# Patient Record
Sex: FEMALE | Race: WHITE | NOT HISPANIC OR LATINO | Employment: FULL TIME | URBAN - NONMETROPOLITAN AREA
[De-identification: names, ages, dates, MRNs, and addresses within clinical notes are randomized per-mention and may not be internally consistent; named-entity substitution may affect disease eponyms.]

---

## 2023-07-14 DIAGNOSIS — F32.A DEPRESSION, UNSPECIFIED DEPRESSION TYPE: ICD-10-CM

## 2023-07-14 RX ORDER — DESVENLAFAXINE SUCCINATE 50 MG/1
50 TABLET, EXTENDED RELEASE ORAL DAILY
COMMUNITY
End: 2023-07-14 | Stop reason: SDUPTHER

## 2023-07-14 RX ORDER — DESVENLAFAXINE 50 MG/1
50 TABLET, EXTENDED RELEASE ORAL NIGHTLY
Qty: 30 TABLET | Refills: 0 | Status: SHIPPED | OUTPATIENT
Start: 2023-07-14 | End: 2023-08-09 | Stop reason: SDUPTHER

## 2023-07-14 RX ORDER — BUSPIRONE HYDROCHLORIDE 10 MG/1
TABLET ORAL
Qty: 75 TABLET | Refills: 0 | Status: SHIPPED | OUTPATIENT
Start: 2023-07-14 | End: 2023-08-09 | Stop reason: SDUPTHER

## 2023-08-01 ENCOUNTER — APPOINTMENT (OUTPATIENT)
Dept: PRIMARY CARE | Facility: CLINIC | Age: 48
End: 2023-08-01
Payer: COMMERCIAL

## 2023-08-09 ENCOUNTER — OFFICE VISIT (OUTPATIENT)
Dept: PRIMARY CARE | Facility: CLINIC | Age: 48
End: 2023-08-09
Payer: COMMERCIAL

## 2023-08-09 VITALS
DIASTOLIC BLOOD PRESSURE: 74 MMHG | OXYGEN SATURATION: 96 % | BODY MASS INDEX: 23.64 KG/M2 | SYSTOLIC BLOOD PRESSURE: 115 MMHG | RESPIRATION RATE: 18 BRPM | WEIGHT: 156 LBS | HEIGHT: 68 IN | HEART RATE: 76 BPM

## 2023-08-09 DIAGNOSIS — F41.9 ANXIETY: ICD-10-CM

## 2023-08-09 DIAGNOSIS — F32.A DEPRESSION, UNSPECIFIED DEPRESSION TYPE: ICD-10-CM

## 2023-08-09 DIAGNOSIS — J44.9 CHRONIC OBSTRUCTIVE PULMONARY DISEASE, UNSPECIFIED COPD TYPE (MULTI): ICD-10-CM

## 2023-08-09 DIAGNOSIS — Z12.31 ENCOUNTER FOR SCREENING MAMMOGRAM FOR MALIGNANT NEOPLASM OF BREAST: ICD-10-CM

## 2023-08-09 DIAGNOSIS — K21.9 GASTROESOPHAGEAL REFLUX DISEASE WITHOUT ESOPHAGITIS: ICD-10-CM

## 2023-08-09 PROCEDURE — 99213 OFFICE O/P EST LOW 20 MIN: CPT | Performed by: INTERNAL MEDICINE

## 2023-08-09 RX ORDER — BUSPIRONE HYDROCHLORIDE 10 MG/1
TABLET ORAL
COMMUNITY
Start: 2022-02-22 | End: 2023-08-09 | Stop reason: DRUGHIGH

## 2023-08-09 RX ORDER — ALBUTEROL SULFATE 90 UG/1
AEROSOL, METERED RESPIRATORY (INHALATION)
COMMUNITY

## 2023-08-09 RX ORDER — DESVENLAFAXINE 50 MG/1
50 TABLET, EXTENDED RELEASE ORAL NIGHTLY
Qty: 90 TABLET | Refills: 0 | Status: SHIPPED | OUTPATIENT
Start: 2023-08-09 | End: 2024-02-16 | Stop reason: SDUPTHER

## 2023-08-09 RX ORDER — OMEPRAZOLE 20 MG/1
CAPSULE, DELAYED RELEASE ORAL
COMMUNITY

## 2023-08-09 RX ORDER — BUSPIRONE HYDROCHLORIDE 10 MG/1
TABLET ORAL
Qty: 75 TABLET | Refills: 0 | Status: SHIPPED | OUTPATIENT
Start: 2023-08-09 | End: 2024-02-16 | Stop reason: SDUPTHER

## 2023-08-09 ASSESSMENT — PAIN SCALES - GENERAL: PAINLEVEL: 0-NO PAIN

## 2023-08-09 NOTE — PROGRESS NOTES
SUBJECTIVE: She states she has been doing ok. She states her depression is stable. She denies any issues with her urine or bowel movements. She does have slight cough, but it is not bad. She states she walks a lot at work and when she is not at work, she takes her dogs for a walk, about a mile or two. She states she is still having hot flashes but it is not as bad as it was before. She had been going to the gym, but has not for past couple weeks because her son who is her gym partner     HPI Lianna Love is a 48 y.o. female with PMH remarkable for GERD, COPD, tobacco dependence, anxiety/depression who presents to the office today for check up. She was most recently seen in the office on 11/28/22 for annual exam.     HEALTH MAINTENANCE: FOLLOW UP  Smoking: current every day smoker  Mammogram (40-75):  10/31/22  Pap smear (21-65): sees OB/GYN  Labs: 11/28/22  Colonoscopy (45-75): done 11/3/2022 with Dr Reid, 2 polyps removed (TRANSVERSE COLON POLYP, COLD SNARE: TUBULAR ADENOMA. SIGMOID COLON POLYP, COLD SNARE: HYPERPLASTIC POLYP. Rec repeat in 5 years.  Lung cancer (55-80 + 30 pack year + smoking/quit in last 15 years): n/a  DEXA (65+, q 2 years): n/a    SOCIAL HISTORY:  Social History     Socioeconomic History    Marital status:      Spouse name: Not on file    Number of children: Not on file    Years of education: Not on file    Highest education level: Not on file   Occupational History    Not on file   Tobacco Use    Smoking status: Not on file    Smokeless tobacco: Not on file   Substance and Sexual Activity    Alcohol use: Not on file    Drug use: Not on file    Sexual activity: Not on file   Other Topics Concern    Not on file   Social History Narrative    Not on file     Social Determinants of Health     Financial Resource Strain: Not on file   Food Insecurity: Not on file   Transportation Needs: Not on file   Physical Activity: Not on file   Stress: Not on file   Social Connections: Not on file  "  Intimate Partner Violence: Not on file   Housing Stability: Not on file     IMMUNIZATIONS:  Immunization History   Administered Date(s) Administered    Moderna SARS-CoV-2 Vaccination 12/29/2021    Pfizer Purple Cap SARS-CoV-2 01/13/2021, 02/03/2021     REVIEW OF SYSTEMS:  Review of Systems  12 point review of systems negative unless stated above in HPI    ALLERGIES:  No Known Allergies    VITAL SIGNS:  Vitals:    08/09/23 1650   BP: 115/74   Pulse: 76   Resp: 18   SpO2: 96%   Weight: 70.8 kg (156 lb)   Height: 1.727 m (5' 8\")     PHYSICAL EXAM:  General: Alert and oriented x3, well nourished, no acute distress.  Eye: PERRL, EOMI, normal conjunctiva.  HENT: Normocephalic, clear tympanic membranes, moist oral mucosa, no sinus tenderness.  Neck: Supple, non-tender, no carotid bruits, no JVD, no lymphadenopathy.  Lungs: Clear to auscultation, non-labored respiration.  Heart: Normal rate, regular rhythm, no murmur, gallop or edema.  Abdomen: Soft, non-tender, non-distended, normal bowel sounds, no masses.  Musculoskeletal: Normal range of motion and strength, no tenderness or swelling.  Skin: Skin is warm, dry and pink, no rashes or lesions.  Neurologic: Alert & Oriented x3, intact senses, motor, response and reflexes ok, normal strength, CN II-XII intact.  Psychiatric: Cooperative, appropriate mood and affect.    MEDICATIONS:  Current Outpatient Medications on File Prior to Visit   Medication Sig Dispense Refill    busPIRone (Buspar) 10 mg tablet Take 1.5 tab in the am and 1 tab in the pm 75 tablet 0    desvenlafaxine 50 mg 24 hr tablet Take 1 tablet (50 mg) by mouth once daily at bedtime. 30 tablet 0     No current facility-administered medications on file prior to visit.      LABORATORY DATA:  Lab Results   Component Value Date    WBC 6.9 11/28/2022    HGB 13.3 11/28/2022    HCT 41.5 11/28/2022     11/28/2022    CHOL 178 12/10/2021    TRIG 108 12/10/2021    HDL 52.0 12/10/2021    ALT 10 11/28/2022    AST 16 " 11/28/2022     11/28/2022    K 4.0 11/28/2022     11/28/2022    CREATININE 0.70 11/28/2022    BUN 10 11/28/2022    CO2 26 11/28/2022    TSH 0.99 11/28/2022    HGBA1C 5.5 12/10/2021     ASSESSMENT AND PLAN:  Health Maintenance: Follow up  Smoking: Current Smoker  Last Colonoscopy (50-75): done 11/3/2022 with Dr Reid, 2 polyps removed (TRANSVERSE COLON POLYP, COLD SNARE: TUBULAR ADENOMA. SIGMOID COLON POLYP, COLD SNARE: HYPERPLASTIC POLYP. Rec repeat in 5 years.  Mammogram (40-75): done 10/31/2022, DUE, order inputted  Labs: 11/03/22 WNL  EKG: done 2/23/2022  - BP is good today, 115/74  - she has has weight loss since last exam, states she has been going to gym with her son     Anxiety / Depression  - stable  - continue with buspar and desvenlafaxine     COPD, Tobacco Dependence  - stable  - continue with PRN ventolin  - discussed cessation.     GERD  - continue with PPI     Follow up in four months    --------------------  Written by Asia Farley LPN, acting as a scribe for Dr. Erickson. This note accurately reflects the work and decisions made by Dr. Erickson.     I, Dr. Erickson, attest all medical record entries made by the scribe were under my direction and were personally dictated by me. I have reviewed the chart and agree that the record accurately reflects my performance of the history, physical exam, and assessment and plan.

## 2023-08-11 NOTE — PATIENT INSTRUCTIONS
It was great to see you in the office today! Here is what we discussed at your visit today:  Please continue to take your current medications   I have ordered you a mammogram to be done as soon as you are able.  We will call the results.  Follow up in four months

## 2024-02-16 DIAGNOSIS — F32.A DEPRESSION, UNSPECIFIED DEPRESSION TYPE: ICD-10-CM

## 2024-02-16 RX ORDER — BUSPIRONE HYDROCHLORIDE 10 MG/1
TABLET ORAL
Qty: 75 TABLET | Refills: 0 | Status: SHIPPED | OUTPATIENT
Start: 2024-02-16 | End: 2024-05-30 | Stop reason: SDUPTHER

## 2024-02-16 RX ORDER — DESVENLAFAXINE 50 MG/1
50 TABLET, EXTENDED RELEASE ORAL NIGHTLY
Qty: 90 TABLET | Refills: 0 | Status: SHIPPED | OUTPATIENT
Start: 2024-02-16 | End: 2024-05-30 | Stop reason: SDUPTHER

## 2024-02-19 ENCOUNTER — OFFICE VISIT (OUTPATIENT)
Dept: PRIMARY CARE | Facility: CLINIC | Age: 49
End: 2024-02-19
Payer: COMMERCIAL

## 2024-02-19 VITALS
RESPIRATION RATE: 16 BRPM | BODY MASS INDEX: 24.98 KG/M2 | HEART RATE: 69 BPM | HEIGHT: 68 IN | SYSTOLIC BLOOD PRESSURE: 107 MMHG | WEIGHT: 164.8 LBS | OXYGEN SATURATION: 95 % | DIASTOLIC BLOOD PRESSURE: 70 MMHG

## 2024-02-19 DIAGNOSIS — F17.200 SMOKER: ICD-10-CM

## 2024-02-19 DIAGNOSIS — H61.22 IMPACTED CERUMEN OF LEFT EAR: ICD-10-CM

## 2024-02-19 DIAGNOSIS — K21.9 GASTROESOPHAGEAL REFLUX DISEASE WITHOUT ESOPHAGITIS: ICD-10-CM

## 2024-02-19 DIAGNOSIS — Z13.220 LIPID SCREENING: ICD-10-CM

## 2024-02-19 DIAGNOSIS — J44.9 CHRONIC OBSTRUCTIVE PULMONARY DISEASE, UNSPECIFIED COPD TYPE (MULTI): ICD-10-CM

## 2024-02-19 DIAGNOSIS — J32.9 SINUSITIS, UNSPECIFIED CHRONICITY, UNSPECIFIED LOCATION: ICD-10-CM

## 2024-02-19 DIAGNOSIS — F32.A DEPRESSION, UNSPECIFIED DEPRESSION TYPE: ICD-10-CM

## 2024-02-19 DIAGNOSIS — F41.9 ANXIETY: ICD-10-CM

## 2024-02-19 DIAGNOSIS — Z00.00 HEALTHCARE MAINTENANCE: ICD-10-CM

## 2024-02-19 PROBLEM — R87.610 ASCUS OF CERVIX WITH NEGATIVE HIGH RISK HPV: Status: ACTIVE | Noted: 2017-03-06

## 2024-02-19 PROCEDURE — 99204 OFFICE O/P NEW MOD 45 MIN: CPT | Performed by: INTERNAL MEDICINE

## 2024-02-19 RX ORDER — FLUTICASONE PROPIONATE 50 MCG
1 SPRAY, SUSPENSION (ML) NASAL DAILY
Qty: 16 G | Refills: 5 | Status: SHIPPED | OUTPATIENT
Start: 2024-02-19 | End: 2025-02-18

## 2024-02-19 ASSESSMENT — COLUMBIA-SUICIDE SEVERITY RATING SCALE - C-SSRS
1. IN THE PAST MONTH, HAVE YOU WISHED YOU WERE DEAD OR WISHED YOU COULD GO TO SLEEP AND NOT WAKE UP?: NO
6. HAVE YOU EVER DONE ANYTHING, STARTED TO DO ANYTHING, OR PREPARED TO DO ANYTHING TO END YOUR LIFE?: NO
2. HAVE YOU ACTUALLY HAD ANY THOUGHTS OF KILLING YOURSELF?: NO

## 2024-02-19 ASSESSMENT — PATIENT HEALTH QUESTIONNAIRE - PHQ9
SUM OF ALL RESPONSES TO PHQ9 QUESTIONS 1 AND 2: 0
2. FEELING DOWN, DEPRESSED OR HOPELESS: NOT AT ALL
1. LITTLE INTEREST OR PLEASURE IN DOING THINGS: NOT AT ALL

## 2024-02-19 ASSESSMENT — PAIN SCALES - GENERAL: PAINLEVEL: 0-NO PAIN

## 2024-02-19 ASSESSMENT — ENCOUNTER SYMPTOMS: CONSTITUTIONAL NEGATIVE: 1

## 2024-02-19 NOTE — PROGRESS NOTES
States that it feels like there is fluid in both of her ears that started about a week ago, denies any pain or hearing loss.

## 2024-02-19 NOTE — PROGRESS NOTES
"Patient ID: She states she has been feeling ok, other than some \"weird stuff\" going on in her ears. She states she feels like she has fluid in her ears, used some homeopathic ear drops but no improvement. She states her anxiety is stable. 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 She states that her breathing has been stable. She states her weight has been fairly stable. She tries to stay active.    BINDU Love is a 48 y.o. female with PMH remarkable for GERD, COPD, tobacco dependence, anxiety/depression who presents to the office today for follow up.     HEALTH MAINTENANCE: FOLLOW UP  Smoking: current every day smoker  Mammogram (40-75):  10/31/22, DUE, was ordered previously but not done yet  Pap smear (21-65): sees OB/GYN  Labs: 11/28/22, DUE  Colonoscopy (45-75): done 11/3/2022 with Dr Reid, 2 polyps removed (TRANSVERSE COLON POLYP, COLD SNARE: TUBULAR ADENOMA. SIGMOID COLON POLYP, COLD SNARE: HYPERPLASTIC POLYP. Rec repeat in 5 years.  Lung cancer (55-80 + 30 pack year + smoking/quit in last 15 years): n/a  DEXA (65+, q 2 years): n/a    SOCIAL HISTORY:  Social History     Tobacco Use    Smoking status: Every Day     Types: Cigarettes    Smokeless tobacco: Never   Substance Use Topics    Alcohol use: Yes     Comment: social    Drug use: Never     IMMUNIZATIONS:  Immunization History   Administered Date(s) Administered    DTP 11/26/1975    Hepatitis B vaccine, adult (RECOMBIVAX, ENGERIX) 02/16/2000    Measles / Rubella 11/26/1975    Moderna SARS-CoV-2 Vaccination 12/29/2021    Pfizer Purple Cap SARS-CoV-2 01/13/2021, 02/03/2021    Polio, Unspecified 11/26/1975     Social History     Tobacco Use    Smoking status: Every Day     Types: Cigarettes    Smokeless tobacco: Never   Substance Use Topics    Alcohol use: Yes     Comment: social      Review of Systems   Constitutional: Negative.    HENT:          Sensation of fluid in ears   Respiratory: Negative.     Cardiovascular: Negative.  " "  Gastrointestinal: Negative.    Genitourinary: Negative.    Musculoskeletal: Negative.    Neurological: Negative.    Psychiatric/Behavioral: Negative.       VITAL SIGNS:  Vitals:    02/19/24 1512   BP: 107/70   BP Location: Right arm   Pulse: 69   Resp: 16   SpO2: 95%   Weight: 74.8 kg (164 lb 12.8 oz)   Height: 1.727 m (5' 8\")       Visit Vitals  Smoking Status Every Day     ALLERGIES:  No Known Allergies     Physical Exam  Vitals reviewed.   Constitutional:       Appearance: Normal appearance.   HENT:      Head: Normocephalic and atraumatic.      Comments: Right ear with some fluid, left ear noted to have some debris and cerumen impaction  Cardiovascular:      Rate and Rhythm: Normal rate and regular rhythm.      Pulses: Normal pulses.      Heart sounds: Normal heart sounds.   Pulmonary:      Effort: Pulmonary effort is normal.      Breath sounds: Normal breath sounds.   Abdominal:      General: Bowel sounds are normal.      Palpations: Abdomen is soft.   Musculoskeletal:         General: Normal range of motion.   Neurological:      General: No focal deficit present.      Mental Status: She is alert and oriented to person, place, and time.   Psychiatric:         Mood and Affect: Mood normal.         Behavior: Behavior normal.         MEDICATIONS:  Current Outpatient Medications   Medication Instructions    albuterol 90 mcg/actuation inhaler use 2 (TWO) puffs BY MOUTH FOUR TIMES DAILY    busPIRone (Buspar) 10 mg tablet Take 1.5 tab in the am and 1 tab in the pm    desvenlafaxine (PRISTIQ) 50 mg, oral, Nightly    omeprazole (PriLOSEC) 20 mg DR capsule oral      RECENT LABS:  Lab Results   Component Value Date    WBC 6.9 11/28/2022    HGB 13.3 11/28/2022    HCT 41.5 11/28/2022     11/28/2022    CHOL 178 12/10/2021    TRIG 108 12/10/2021    HDL 52.0 12/10/2021    ALT 10 11/28/2022    AST 16 11/28/2022     11/28/2022    K 4.0 11/28/2022     11/28/2022    CREATININE 0.70 11/28/2022    BUN 10 " 11/28/2022    CO2 26 11/28/2022    TSH 0.99 11/28/2022    HGBA1C 5.5 12/10/2021     ASSESSMENT AND PLAN:  Assessment/Plan   - Lianna was seen today for ear fullness and med refill.  Diagnoses and all orders for this visit:  Sinusitis, unspecified chronicity, unspecified location  -     fluticasone (Flonase) 50 mcg/actuation nasal spray; Administer 1 spray into each nostril once daily. Shake gently. Before first use, prime pump. After use, clean tip and replace cap.  Impacted cerumen of left ear  -     carbamide peroxide (Debrox) 6.5 % otic solution; Administer 5 drops into the left ear 2 times a day for 4 days.  Lipid screening  -     Lipid panel; Future  Healthcare maintenance  -     CBC and Auto Differential; Future  -     Comprehensive metabolic panel; Future  -     Tsh With Reflex To Free T4 If Abnormal; Future    Anxiety / Depression  - states depression and anxiety are stable  - continue with buspar and desvenlafaxine     COPD, Tobacco Dependence  - stable  - continue with PRN ventolin  - discussed smoking cessation. She states she has set a date of March 1 to quit smoking     GERD  - stable  - continue with PPI      Follow up in four months  --------------------  Written by Asia Farley LPN, acting as a scribe for Dr. Erickson. This note accurately reflects the work and decisions made by Dr. Erickson.     I, Dr. Erickson, attest all medical record entries made by the scribe were under my direction and were personally dictated by me. I have reviewed the chart and agree that the record accurately reflects my performance of the history, physical exam, and assessment and plan.

## 2024-02-20 ASSESSMENT — ENCOUNTER SYMPTOMS
RESPIRATORY NEGATIVE: 1
GASTROINTESTINAL NEGATIVE: 1
CARDIOVASCULAR NEGATIVE: 1
NEUROLOGICAL NEGATIVE: 1
MUSCULOSKELETAL NEGATIVE: 1
PSYCHIATRIC NEGATIVE: 1

## 2024-05-30 ENCOUNTER — OFFICE VISIT (OUTPATIENT)
Dept: PRIMARY CARE | Facility: CLINIC | Age: 49
End: 2024-05-30
Payer: COMMERCIAL

## 2024-05-30 VITALS
BODY MASS INDEX: 24.92 KG/M2 | HEART RATE: 68 BPM | WEIGHT: 164.4 LBS | DIASTOLIC BLOOD PRESSURE: 72 MMHG | RESPIRATION RATE: 18 BRPM | SYSTOLIC BLOOD PRESSURE: 106 MMHG | HEIGHT: 68 IN | OXYGEN SATURATION: 94 %

## 2024-05-30 DIAGNOSIS — F32.A DEPRESSION, UNSPECIFIED DEPRESSION TYPE: ICD-10-CM

## 2024-05-30 PROCEDURE — 4004F PT TOBACCO SCREEN RCVD TLK: CPT

## 2024-05-30 PROCEDURE — 99213 OFFICE O/P EST LOW 20 MIN: CPT

## 2024-05-30 RX ORDER — DESVENLAFAXINE 50 MG/1
50 TABLET, EXTENDED RELEASE ORAL NIGHTLY
Qty: 90 TABLET | Refills: 0 | Status: SHIPPED | OUTPATIENT
Start: 2024-05-30 | End: 2024-05-30

## 2024-05-30 RX ORDER — DESVENLAFAXINE 50 MG/1
50 TABLET, EXTENDED RELEASE ORAL NIGHTLY
Qty: 90 TABLET | Refills: 0 | Status: SHIPPED | OUTPATIENT
Start: 2024-05-30

## 2024-05-30 RX ORDER — BUSPIRONE HYDROCHLORIDE 10 MG/1
TABLET ORAL
Qty: 225 TABLET | Refills: 0 | Status: SHIPPED | OUTPATIENT
Start: 2024-05-30

## 2024-05-30 ASSESSMENT — ENCOUNTER SYMPTOMS
VOMITING: 0
CHILLS: 0
NAUSEA: 0
SHORTNESS OF BREATH: 0
DYSPHORIC MOOD: 1
ABDOMINAL PAIN: 0
DIZZINESS: 0
FEVER: 0
NERVOUS/ANXIOUS: 0
DIARRHEA: 0
FATIGUE: 0
HEADACHES: 0
CONSTIPATION: 0

## 2024-05-30 ASSESSMENT — PAIN SCALES - GENERAL: PAINLEVEL: 0-NO PAIN

## 2024-05-30 NOTE — PROGRESS NOTES
"Subjective   Patient ID: Lianna Love is a 49 y.o. female who presents for Follow-up (3 mth/Depression has increased).    HPI   48 y.o. female with PMH remarkable for GERD, COPD, tobacco dependence, anxiety/depression who presents to the office today for follow up for depression/anxiety.     Anxiety/Depression:  - increased recently, just got out of long relationship, son is graduated and moving away to college, will be an empty cristiane with two dogs in the house, still sad about parents passing 2 years ago.   - Would like to increase desvenlafaxine.   - Denies SI/HI.       Review of Systems   Constitutional:  Negative for chills, fatigue and fever.   Respiratory:  Negative for shortness of breath.    Cardiovascular:  Negative for chest pain.   Gastrointestinal:  Negative for abdominal pain, constipation, diarrhea, nausea and vomiting.   Neurological:  Negative for dizziness and headaches.   Psychiatric/Behavioral:  Positive for dysphoric mood. Negative for suicidal ideas. The patient is not nervous/anxious.    All other systems reviewed and are negative.      Objective   /72   Pulse 68   Resp 18   Ht 1.727 m (5' 8\")   Wt 74.6 kg (164 lb 6.4 oz)   SpO2 94%   BMI 25.00 kg/m²     Physical Exam  Constitutional:       Appearance: Normal appearance.   HENT:      Head: Normocephalic and atraumatic.   Eyes:      Extraocular Movements: Extraocular movements intact.      Conjunctiva/sclera: Conjunctivae normal.   Cardiovascular:      Rate and Rhythm: Normal rate and regular rhythm.   Pulmonary:      Effort: Pulmonary effort is normal.      Breath sounds: Normal breath sounds. No wheezing, rhonchi or rales.   Musculoskeletal:      Cervical back: Normal range of motion and neck supple.   Neurological:      General: No focal deficit present.      Mental Status: She is alert and oriented to person, place, and time. Mental status is at baseline.   Psychiatric:         Attention and Perception: Attention and perception " normal.         Mood and Affect: Mood is depressed. Affect is tearful.         Speech: Speech normal.         Behavior: Behavior normal.         Thought Content: Thought content normal. Thought content does not include homicidal or suicidal ideation. Thought content does not include homicidal or suicidal plan.         Judgment: Judgment normal.         Assessment/Plan   Problem List Items Addressed This Visit             ICD-10-CM    Depression F32.A    Relevant Medications    busPIRone (Buspar) 10 mg tablet    desvenlafaxine 50 mg 24 hr tablet     - Recommend increase of desvenlafaxine from 50mg to 75mg daily. Risks and benefits discussed. Will follow up if no improvement in symptoms.

## 2024-08-09 DIAGNOSIS — F32.A DEPRESSION, UNSPECIFIED DEPRESSION TYPE: ICD-10-CM

## 2024-08-12 RX ORDER — DESVENLAFAXINE 50 MG/1
50 TABLET, EXTENDED RELEASE ORAL NIGHTLY
Qty: 90 TABLET | Refills: 0 | Status: SHIPPED | OUTPATIENT
Start: 2024-08-12

## 2024-08-13 DIAGNOSIS — F32.A DEPRESSION, UNSPECIFIED DEPRESSION TYPE: ICD-10-CM

## 2024-08-13 RX ORDER — DESVENLAFAXINE 50 MG/1
TABLET, EXTENDED RELEASE ORAL
Qty: 90 TABLET | Refills: 0 | Status: SHIPPED | OUTPATIENT
Start: 2024-08-13

## 2024-08-13 RX ORDER — DESVENLAFAXINE SUCCINATE 25 MG/1
TABLET, EXTENDED RELEASE ORAL
Qty: 90 TABLET | Refills: 0 | Status: SHIPPED | OUTPATIENT
Start: 2024-08-13

## 2024-09-05 ENCOUNTER — APPOINTMENT (OUTPATIENT)
Dept: OBSTETRICS AND GYNECOLOGY | Facility: CLINIC | Age: 49
End: 2024-09-05
Payer: COMMERCIAL

## 2024-09-06 ENCOUNTER — APPOINTMENT (OUTPATIENT)
Dept: OBSTETRICS AND GYNECOLOGY | Facility: CLINIC | Age: 49
End: 2024-09-06
Payer: COMMERCIAL

## 2024-09-06 VITALS
WEIGHT: 158 LBS | SYSTOLIC BLOOD PRESSURE: 120 MMHG | DIASTOLIC BLOOD PRESSURE: 70 MMHG | BODY MASS INDEX: 23.95 KG/M2 | HEIGHT: 68 IN

## 2024-09-06 DIAGNOSIS — Z12.31 ENCOUNTER FOR SCREENING MAMMOGRAM FOR MALIGNANT NEOPLASM OF BREAST: ICD-10-CM

## 2024-09-06 DIAGNOSIS — Z12.4 CERVICAL CANCER SCREENING: ICD-10-CM

## 2024-09-06 DIAGNOSIS — Z01.419 WELL WOMAN EXAM WITH ROUTINE GYNECOLOGICAL EXAM: Primary | ICD-10-CM

## 2024-09-06 DIAGNOSIS — Z11.3 SCREEN FOR STD (SEXUALLY TRANSMITTED DISEASE): ICD-10-CM

## 2024-09-06 DIAGNOSIS — N89.8 VAGINAL ODOR: ICD-10-CM

## 2024-09-06 PROCEDURE — 3008F BODY MASS INDEX DOCD: CPT | Performed by: OBSTETRICS & GYNECOLOGY

## 2024-09-06 PROCEDURE — 87624 HPV HI-RISK TYP POOLED RSLT: CPT

## 2024-09-06 PROCEDURE — 87661 TRICHOMONAS VAGINALIS AMPLIF: CPT

## 2024-09-06 PROCEDURE — 87491 CHLMYD TRACH DNA AMP PROBE: CPT

## 2024-09-06 PROCEDURE — 4004F PT TOBACCO SCREEN RCVD TLK: CPT | Performed by: OBSTETRICS & GYNECOLOGY

## 2024-09-06 PROCEDURE — 99386 PREV VISIT NEW AGE 40-64: CPT | Performed by: OBSTETRICS & GYNECOLOGY

## 2024-09-06 PROCEDURE — 87205 SMEAR GRAM STAIN: CPT

## 2024-09-06 PROCEDURE — 87591 N.GONORRHOEAE DNA AMP PROB: CPT

## 2024-09-06 NOTE — PROGRESS NOTES
"Subjective   Lianna Love is a 49 y.o. female who is here for a routine well woman exam.     She is a nurse.    She was last here in 2020 for breast pain. 2019 for a labial abscess and menorrhagia.    Now menopausal. LMP about 1.5 years ago. No bleeding since.    Hot flashes day and night. Manageable.    Not sure when her last pap test was done. Thinks she did have an abnormal one in the past.    Usually using condoms. Found out her most recent partner had cheated. Wants STD screening.  Having a vaginal odor, thinks BV. No discharge though.    No breast problems.    Family history of uterine or ovarian cancer: no  Family history of breast cancer: no    ROS:  Constitutional: Negative  Eyes: Negative  HEENT: Negative  Respiratory: Negative  Cardiovascular: Negative  GI: Negative  : + vaginal odor, urinary urgency  Endocrine: + hot flashes   Breast: Negative  Musculoskeletal: Negative  Skin: Negative  Hematologic: Negative  Neurologic: + headaches  Psychiatric: + anxiety/depression    Objective   /70   Ht 1.727 m (5' 8\")   Wt 71.7 kg (158 lb)   BMI 24.02 kg/m²     Constitutional: Alert and in no acute distress.     Pulmonary: No respiratory distress.     Chest: Breasts: Normal appearance, no nipple discharge, and no skin changes. Palpation of breasts and axillae: No palpable mass and no axillary lymphadenopathy.    Abdomen: Soft, nontender; no abdominal mass palpated.     Genitourinary:   External genitalia: Normal appearance.  No inguinal lymphadenopathy.   Bartholin's, Urethral, and Skenes Glands: Normal.   Urethra: Normal. Bladder: Normal on palpation.   Vagina: thin white discharge  Cervix: Normal appearance, nontender.   Uterus/Adnexa: Normal size, mobile uterus. Nontender, no masses palpated in adnexa  Inspection of perianal area: Normal.    Musculoskeletal: No joint swelling seen, normal movements of all extremities.    Skin: Normal skin color and pigmentation, normal skin turgor, and no " rash.    Psychiatric: Alert and oriented x 3. Affect normal to patient's baseline. Mood: Appropriate.       Assessment/Plan   1. Well woman exam with routine gynecological exam  2. Vaginal odor  - Vaginitis Gram Stain For Bacterial Vaginosis + Yeast  3. Cervical cancer screening  - THINPREP PAP  4. Screen for STD (sexually transmitted disease)  - C. trachomatis / N. gonorrhoeae, Amplified  - Trichomonas vaginalis, Nucleic Acid Detection  5. Encounter for screening mammogram for malignant neoplasm of breast  - BI mammo bilateral screening tomosynthesis; Future    Breast and pelvic exam done today.  Pap and HPV screening ordered.  STD screening done.  Vaginitis testing sent.   She was given an order for a screening mammogram.  Discussed ways to help hot flashes and other menopausal symptoms.  Advised her to call with any new problems or questions.  Follow up in 1 year for the next well woman visit.

## 2024-09-07 LAB
C TRACH RRNA SPEC QL NAA+PROBE: NEGATIVE
N GONORRHOEA DNA SPEC QL PROBE+SIG AMP: NEGATIVE
T VAGINALIS RRNA SPEC QL NAA+PROBE: NEGATIVE

## 2024-09-10 ENCOUNTER — TELEPHONE (OUTPATIENT)
Dept: OBSTETRICS AND GYNECOLOGY | Facility: HOSPITAL | Age: 49
End: 2024-09-10
Payer: COMMERCIAL

## 2024-09-10 LAB
CLUE CELLS VAG LPF-#/AREA: PRESENT /[LPF]
NUGENT SCORE: 8
YEAST VAG WET PREP-#/AREA: ABNORMAL

## 2024-09-11 DIAGNOSIS — B96.89 BACTERIAL VAGINOSIS: Primary | ICD-10-CM

## 2024-09-11 DIAGNOSIS — N76.0 BACTERIAL VAGINOSIS: Primary | ICD-10-CM

## 2024-09-11 RX ORDER — METRONIDAZOLE 500 MG/1
500 TABLET ORAL 2 TIMES DAILY
Qty: 14 TABLET | Refills: 0 | Status: SHIPPED | OUTPATIENT
Start: 2024-09-11 | End: 2024-09-18

## 2024-09-11 NOTE — TELEPHONE ENCOUNTER
Called the patient. Left her a message. Let her know I sent in a prescription to the pharmacy to treat BV.

## 2024-11-15 DIAGNOSIS — F32.A DEPRESSION, UNSPECIFIED DEPRESSION TYPE: ICD-10-CM

## 2024-11-15 RX ORDER — DESVENLAFAXINE 50 MG/1
TABLET, EXTENDED RELEASE ORAL
Qty: 90 TABLET | Refills: 0 | Status: SHIPPED | OUTPATIENT
Start: 2024-11-15

## 2024-11-15 RX ORDER — BUSPIRONE HYDROCHLORIDE 10 MG/1
TABLET ORAL
Qty: 225 TABLET | Refills: 0 | Status: SHIPPED | OUTPATIENT
Start: 2024-11-15

## 2024-11-15 RX ORDER — DESVENLAFAXINE SUCCINATE 25 MG/1
TABLET, EXTENDED RELEASE ORAL
Qty: 90 TABLET | Refills: 0 | Status: SHIPPED | OUTPATIENT
Start: 2024-11-15

## 2025-01-30 NOTE — PROGRESS NOTES
Patient ID: States that she has a dull aching pain to her left arm into shoulder x 1 month. Denies any numbness. States that she has limited movement to her arm due to the pain. She states that she can lift her arm but she can not raise if fully. States that Ibuprofen works  for few hours then the pain comes right back. Denies any falls or injuries. She states that the she might have hurt it when she was shoveling after the first winter storm. Denies any chest pain or shortness of breath.      Last Office Visit: 5/30/24 for increased anxiety/depression, just got out of long relationship, son is graduated and moving away to college, will be an empty cristiane with two dogs in the house, still sad about parents passing 2 years ago.  denies SI/HI increased Pristiq    HPI:Lianna Love is a 49 y.o. female with PMH remarkable for GERD, COPD, tobacco dependence, anxiety/depression  who presents to the office today for left shoulder pain.    Pain with abduction at 35-40 no pain with internal or external tender over AC joint and bicep area  No Neurovascular deficiency   biceps tenditits   AC osterarth   If no improvement call and we will order xray and physical therapy    Social History     Tobacco Use    Smoking status: Every Day     Types: Cigarettes     Passive exposure: Current    Smokeless tobacco: Never    Tobacco comments:     1 ppd - 33 years   Substance Use Topics    Alcohol use: Yes     Comment: social      Review of Systems   Constitutional: Negative.    HENT: Negative.     Eyes: Negative.    Respiratory: Negative.     Cardiovascular: Negative.    Gastrointestinal: Negative.    Endocrine: Negative.    Genitourinary: Negative.    Musculoskeletal:  Positive for myalgias.        Limited movement to left arm   Skin: Negative.    Neurological: Negative.    Psychiatric/Behavioral: Negative.     All other systems reviewed and are negative.    Visit Vitals  /66 (BP Location: Right arm, Patient Position: Sitting)   Pulse  "64   Resp 18   Ht 1.727 m (5' 8\")   Wt 67.6 kg (149 lb)   SpO2 95%   BMI 22.66 kg/m²   OB Status Menopausal   Smoking Status Every Day   BSA 1.8 m²     No Known Allergies   Physical Exam  Vitals reviewed. Exam conducted with a chaperone present.   Constitutional:       Appearance: Normal appearance. She is well-developed and normal weight.   HENT:      Head: Normocephalic.      Right Ear: External ear normal.      Left Ear: External ear normal.      Nose: Nose normal.      Mouth/Throat:      Lips: Pink.      Mouth: Mucous membranes are moist.   Eyes:      General: Lids are normal.      Pupils: Pupils are equal, round, and reactive to light.   Neck:      Trachea: Trachea normal.   Cardiovascular:      Rate and Rhythm: Normal rate and regular rhythm.      Heart sounds: Normal heart sounds.   Pulmonary:      Effort: Pulmonary effort is normal.      Breath sounds: Normal breath sounds.   Abdominal:      General: Bowel sounds are normal.      Palpations: Abdomen is soft.   Musculoskeletal:         General: Tenderness present.      Right shoulder: Normal.      Left shoulder: Decreased range of motion.      Cervical back: Full passive range of motion without pain and normal range of motion.   Skin:     General: Skin is warm and moist.   Neurological:      General: No focal deficit present.      Mental Status: She is alert and oriented to person, place, and time. Mental status is at baseline.   Psychiatric:         Attention and Perception: Attention normal.         Mood and Affect: Mood normal.         Speech: Speech normal.         Behavior: Behavior is cooperative.         Thought Content: Thought content normal.         Cognition and Memory: Cognition normal.         Judgment: Judgment normal.       Current Outpatient Medications   Medication Instructions    albuterol 90 mcg/actuation inhaler use 2 (TWO) puffs BY MOUTH FOUR TIMES DAILY    busPIRone (Buspar) 10 mg tablet Take 1 tab in the am and 1.5 tab in the pm    " desvenlafaxine (PRISTIQ) 50 mg, oral, Nightly, Take 1.5 tablets daily for a total of 75mg.    desvenlafaxine 50 mg 24 hr tablet Take one tablet daily along with a 25mg tablet for a total of 75mg daily    desvenlafaxine succinate (Pristiq) 25 mg 24 hour tablet Take one tablet daily along with a 50mg tablet daily with total of 75mg daily    fluticasone (Flonase) 50 mcg/actuation nasal spray 1 spray, Each Nostril, Daily, Shake gently. Before first use, prime pump. After use, clean tip and replace cap.    meloxicam (MOBIC) 15 mg, oral, Daily    omeprazole (PriLOSEC) 20 mg DR capsule Take by mouth.    predniSONE (DELTASONE) 40 mg, oral, Daily      Lab Results   Component Value Date    WBC 6.9 11/28/2022    HGB 13.3 11/28/2022    HCT 41.5 11/28/2022     11/28/2022    CHOL 178 12/10/2021    TRIG 108 12/10/2021    HDL 52.0 12/10/2021    ALT 10 11/28/2022    AST 16 11/28/2022     11/28/2022    K 4.0 11/28/2022     11/28/2022    CREATININE 0.70 11/28/2022    BUN 10 11/28/2022    CO2 26 11/28/2022    TSH 0.99 11/28/2022    HGBA1C 5.5 12/10/2021       Assessment/Plan   Problem List Items Addressed This Visit             ICD-10-CM    Depression F32.A     -refill have been sent to the pharmacy for you  -Denies any SI/HI  - states that he feels much better          Relevant Medications    desvenlafaxine succinate (Pristiq) 25 mg 24 hour tablet    desvenlafaxine 50 mg 24 hr tablet    busPIRone (Buspar) 10 mg tablet    Healthcare maintenance Z00.00    Acute bursitis of left shoulder M75.52     -start taking the Meloxicam 15mg and Prednisone 40mg  -If pain gets worse please call the office.          Relevant Medications    predniSONE (Deltasone) 20 mg tablet    meloxicam (Mobic) 15 mg tablet     ------  Written by ZONIA PANCHAL LPN, acting as a scribe for Dr. Erickson. This note accurately reflects the work and decisions made by Dr. Erickson.     I, Dr. Erickson, attest all medical record entries made by the scribe were  under my direction and were personally dictated by me. I have reviewed the chart and agree that the record accurately reflects my performance of the history, physical exam, and assessment and plan.

## 2025-01-31 ENCOUNTER — OFFICE VISIT (OUTPATIENT)
Dept: PRIMARY CARE | Facility: CLINIC | Age: 50
End: 2025-01-31
Payer: COMMERCIAL

## 2025-01-31 VITALS
HEART RATE: 64 BPM | OXYGEN SATURATION: 95 % | SYSTOLIC BLOOD PRESSURE: 110 MMHG | WEIGHT: 149 LBS | RESPIRATION RATE: 18 BRPM | DIASTOLIC BLOOD PRESSURE: 66 MMHG | HEIGHT: 68 IN | BODY MASS INDEX: 22.58 KG/M2

## 2025-01-31 DIAGNOSIS — M75.52 ACUTE BURSITIS OF LEFT SHOULDER: ICD-10-CM

## 2025-01-31 DIAGNOSIS — Z00.00 HEALTHCARE MAINTENANCE: ICD-10-CM

## 2025-01-31 DIAGNOSIS — F32.A DEPRESSION, UNSPECIFIED DEPRESSION TYPE: ICD-10-CM

## 2025-01-31 PROCEDURE — 3008F BODY MASS INDEX DOCD: CPT | Performed by: INTERNAL MEDICINE

## 2025-01-31 PROCEDURE — 99213 OFFICE O/P EST LOW 20 MIN: CPT | Performed by: INTERNAL MEDICINE

## 2025-01-31 RX ORDER — MELOXICAM 15 MG/1
15 TABLET ORAL DAILY
Qty: 15 TABLET | Refills: 0 | Status: SHIPPED | OUTPATIENT
Start: 2025-01-31 | End: 2026-01-31

## 2025-01-31 RX ORDER — DESVENLAFAXINE 50 MG/1
TABLET, EXTENDED RELEASE ORAL
Qty: 90 TABLET | Refills: 0 | Status: SHIPPED | OUTPATIENT
Start: 2025-01-31

## 2025-01-31 RX ORDER — DESVENLAFAXINE SUCCINATE 25 MG/1
TABLET, EXTENDED RELEASE ORAL
Qty: 90 TABLET | Refills: 0 | Status: SHIPPED | OUTPATIENT
Start: 2025-01-31

## 2025-01-31 RX ORDER — PREDNISONE 20 MG/1
40 TABLET ORAL DAILY
Qty: 10 TABLET | Refills: 0 | Status: SHIPPED | OUTPATIENT
Start: 2025-01-31 | End: 2025-02-05

## 2025-01-31 RX ORDER — BUSPIRONE HYDROCHLORIDE 10 MG/1
TABLET ORAL
Qty: 225 TABLET | Refills: 0 | Status: SHIPPED | OUTPATIENT
Start: 2025-01-31

## 2025-01-31 ASSESSMENT — PAIN SCALES - GENERAL: PAINLEVEL_OUTOF10: 6

## 2025-01-31 NOTE — PATIENT INSTRUCTIONS
It was great to see you in the office today! Here is what we discussed at your visit today:    Sent it Meloxicam and Prednisone , take as prescribed  If you're pain is not getting any better of gets worse please call the office.

## 2025-02-02 PROBLEM — Z00.00 HEALTHCARE MAINTENANCE: Status: ACTIVE | Noted: 2025-02-02

## 2025-02-02 PROBLEM — M75.52 ACUTE BURSITIS OF LEFT SHOULDER: Status: ACTIVE | Noted: 2025-02-02

## 2025-02-02 ASSESSMENT — ENCOUNTER SYMPTOMS
GASTROINTESTINAL NEGATIVE: 1
NEUROLOGICAL NEGATIVE: 1
CONSTITUTIONAL NEGATIVE: 1
PSYCHIATRIC NEGATIVE: 1
RESPIRATORY NEGATIVE: 1
EYES NEGATIVE: 1
CARDIOVASCULAR NEGATIVE: 1
MYALGIAS: 1
ENDOCRINE NEGATIVE: 1

## 2025-02-02 NOTE — ASSESSMENT & PLAN NOTE
-refill have been sent to the pharmacy for you  -Denies any SI/HI  - states that he feels much better

## 2025-02-02 NOTE — ASSESSMENT & PLAN NOTE
-start taking the Meloxicam 15mg and Prednisone 40mg  -If pain gets worse please call the office.

## 2025-02-03 ENCOUNTER — APPOINTMENT (OUTPATIENT)
Dept: PRIMARY CARE | Facility: CLINIC | Age: 50
End: 2025-02-03
Payer: COMMERCIAL

## 2025-03-10 ENCOUNTER — TELEPHONE (OUTPATIENT)
Dept: PRIMARY CARE | Facility: CLINIC | Age: 50
End: 2025-03-10
Payer: COMMERCIAL

## 2025-03-10 DIAGNOSIS — M25.512 ACUTE PAIN OF LEFT SHOULDER: Primary | ICD-10-CM

## 2025-03-10 NOTE — TELEPHONE ENCOUNTER
iLanna was seen 1/31 for bursitis of left arm and shoulder. You gave her prednisone and meloxicam for the pain and told her if it started bothering her again to let you know. It started bothering her about a week ago but really started hurting a lot more the past few days. Please advise.

## 2025-03-19 ENCOUNTER — HOSPITAL ENCOUNTER (OUTPATIENT)
Dept: RADIOLOGY | Facility: HOSPITAL | Age: 50
Discharge: HOME | End: 2025-03-19
Payer: COMMERCIAL

## 2025-03-19 DIAGNOSIS — M25.512 ACUTE PAIN OF LEFT SHOULDER: ICD-10-CM

## 2025-03-19 PROCEDURE — 73030 X-RAY EXAM OF SHOULDER: CPT | Mod: LEFT SIDE | Performed by: RADIOLOGY

## 2025-03-19 PROCEDURE — 73030 X-RAY EXAM OF SHOULDER: CPT | Mod: LT

## 2025-04-14 ENCOUNTER — APPOINTMENT (OUTPATIENT)
Dept: PRIMARY CARE | Facility: CLINIC | Age: 50
End: 2025-04-14
Payer: COMMERCIAL

## 2025-04-14 VITALS
DIASTOLIC BLOOD PRESSURE: 66 MMHG | OXYGEN SATURATION: 93 % | WEIGHT: 148 LBS | HEART RATE: 71 BPM | RESPIRATION RATE: 16 BRPM | BODY MASS INDEX: 22.43 KG/M2 | SYSTOLIC BLOOD PRESSURE: 107 MMHG | HEIGHT: 68 IN

## 2025-04-14 DIAGNOSIS — M25.512 LEFT SHOULDER PAIN, UNSPECIFIED CHRONICITY: ICD-10-CM

## 2025-04-14 PROCEDURE — 3008F BODY MASS INDEX DOCD: CPT | Performed by: INTERNAL MEDICINE

## 2025-04-14 PROCEDURE — 99213 OFFICE O/P EST LOW 20 MIN: CPT | Performed by: INTERNAL MEDICINE

## 2025-04-14 PROCEDURE — 20605 DRAIN/INJ JOINT/BURSA W/O US: CPT | Performed by: INTERNAL MEDICINE

## 2025-04-14 RX ORDER — LIDOCAINE HYDROCHLORIDE 10 MG/ML
5 INJECTION, SOLUTION INFILTRATION; PERINEURAL ONCE
Status: COMPLETED | OUTPATIENT
Start: 2025-04-14 | End: 2025-04-14

## 2025-04-14 RX ORDER — TRIAMCINOLONE ACETONIDE 40 MG/ML
40 INJECTION, SUSPENSION INTRA-ARTICULAR; INTRAMUSCULAR ONCE
Status: COMPLETED | OUTPATIENT
Start: 2025-04-14 | End: 2025-04-14

## 2025-04-14 RX ADMIN — TRIAMCINOLONE ACETONIDE 40 MG: 40 INJECTION, SUSPENSION INTRA-ARTICULAR; INTRAMUSCULAR at 16:05

## 2025-04-14 RX ADMIN — TRIAMCINOLONE ACETONIDE 40 MG: 40 INJECTION, SUSPENSION INTRA-ARTICULAR; INTRAMUSCULAR at 16:58

## 2025-04-14 RX ADMIN — LIDOCAINE HYDROCHLORIDE 4 ML: 10 INJECTION, SOLUTION INFILTRATION; PERINEURAL at 16:05

## 2025-04-14 RX ADMIN — LIDOCAINE HYDROCHLORIDE 5 ML: 10 INJECTION, SOLUTION INFILTRATION; PERINEURAL at 16:56

## 2025-04-14 ASSESSMENT — PATIENT HEALTH QUESTIONNAIRE - PHQ9
SUM OF ALL RESPONSES TO PHQ9 QUESTIONS 1 AND 2: 0
1. LITTLE INTEREST OR PLEASURE IN DOING THINGS: NOT AT ALL
2. FEELING DOWN, DEPRESSED OR HOPELESS: NOT AT ALL

## 2025-04-14 ASSESSMENT — ENCOUNTER SYMPTOMS
RESPIRATORY NEGATIVE: 1
ARTHRALGIAS: 1
CONSTITUTIONAL NEGATIVE: 1
NEUROLOGICAL NEGATIVE: 1
PSYCHIATRIC NEGATIVE: 1
GASTROINTESTINAL NEGATIVE: 1
CARDIOVASCULAR NEGATIVE: 1

## 2025-04-14 ASSESSMENT — COLUMBIA-SUICIDE SEVERITY RATING SCALE - C-SSRS
6. HAVE YOU EVER DONE ANYTHING, STARTED TO DO ANYTHING, OR PREPARED TO DO ANYTHING TO END YOUR LIFE?: NO
1. IN THE PAST MONTH, HAVE YOU WISHED YOU WERE DEAD OR WISHED YOU COULD GO TO SLEEP AND NOT WAKE UP?: NO
2. HAVE YOU ACTUALLY HAD ANY THOUGHTS OF KILLING YOURSELF?: NO

## 2025-04-14 ASSESSMENT — PAIN SCALES - GENERAL: PAINLEVEL_OUTOF10: 3

## 2025-04-14 NOTE — PROGRESS NOTES
"Patient ID: Lianna Love is a 50 y.o. female with PMH remarkable for GERD, COPD, tobacco dependence, anxiety/depression  who presents to the office today for Shoulder Pain (left).    Last Office Visit: 1/31/25, had complaint of left shoulder pain, Prednisone and meloxicam ordered at that time  Xray left shoulder ordered and completed on 03/19/25 which revealed: Normal radiographs of the left shoulder      HPI She states she is still having left shoulder pain and left arm pain, had xray last month which was normal.     Social History     Tobacco Use   • Smoking status: Every Day     Types: Cigarettes     Passive exposure: Current   • Smokeless tobacco: Never   • Tobacco comments:     1 ppd - 31 years   Substance Use Topics   • Alcohol use: Yes     Comment: social      Review of Systems   Constitutional: Negative.    HENT: Negative.     Respiratory: Negative.     Cardiovascular: Negative.    Gastrointestinal: Negative.    Genitourinary: Negative.    Musculoskeletal:  Positive for arthralgias.   Skin: Negative.    Neurological: Negative.    Psychiatric/Behavioral: Negative.       Visit Vitals  /66 (BP Location: Right arm)   Pulse 71   Resp 16   Ht 1.727 m (5' 8\")   Wt 67.1 kg (148 lb)   SpO2 93%   BMI 22.50 kg/m²   OB Status Menopausal   Smoking Status Every Day   BSA 1.79 m²     No Known Allergies     Physical Exam  Vitals reviewed.   Constitutional:       Appearance: Normal appearance.   HENT:      Head: Normocephalic and atraumatic.   Musculoskeletal:         General: Tenderness present. Normal range of motion.   Skin:     General: Skin is warm and dry.   Neurological:      General: No focal deficit present.      Mental Status: She is alert and oriented to person, place, and time.   Psychiatric:         Mood and Affect: Mood normal.         Behavior: Behavior normal.     Current Outpatient Medications   Medication Instructions   • albuterol 90 mcg/actuation inhaler use 2 (TWO) puffs BY MOUTH FOUR TIMES DAILY "   • busPIRone (Buspar) 10 mg tablet Take 1 tab in the am and 1.5 tab in the pm   • desvenlafaxine (PRISTIQ) 50 mg, oral, Nightly, Take 1.5 tablets daily for a total of 75mg.   • desvenlafaxine 50 mg 24 hr tablet Take one tablet daily along with a 25mg tablet for a total of 75mg daily   • desvenlafaxine succinate (Pristiq) 25 mg 24 hour tablet Take one tablet daily along with a 50mg tablet daily with total of 75mg daily   • fluticasone (Flonase) 50 mcg/actuation nasal spray 1 spray, Each Nostril, Daily, Shake gently. Before first use, prime pump. After use, clean tip and replace cap.   • meloxicam (MOBIC) 15 mg, oral, Daily   • omeprazole (PriLOSEC) 20 mg DR capsule Take by mouth.      Lab Results   Component Value Date    WBC 6.9 11/28/2022    HGB 13.3 11/28/2022    HCT 41.5 11/28/2022     11/28/2022    CHOL 178 12/10/2021    TRIG 108 12/10/2021    HDL 52.0 12/10/2021    ALT 10 11/28/2022    AST 16 11/28/2022     11/28/2022    K 4.0 11/28/2022     11/28/2022    CREATININE 0.70 11/28/2022    BUN 10 11/28/2022    CO2 26 11/28/2022    TSH 0.99 11/28/2022    HGBA1C 5.5 12/10/2021       Assessment/Plan   Problem List Items Addressed This Visit       Left shoulder pain    Relevant Medications    triamcinolone acetonide (Kenalog-40) injection 40 mg (Completed)    lidocaine (Xylocaine) 10 mg/mL (1 %) injection 5 mL (Completed)      Patient ID: Lianna Love is a 50 y.o. female.    Joint Injection Medium/Arthrocentesis: L acromioclavicular on 4/14/2025 4:05 PM  Details: 21 G needle, posterolateral approach  Medications: 40 mg triamcinolone acetonide 40 mg/mL; 4 mL lidocaine 10 mg/mL (1 %)  Outcome: tolerated well, no immediate complications  Procedure, treatment alternatives, risks and benefits explained, specific risks discussed. Consent was given by the patient.       Potential complications including bleeding, infection and allergic reaction were discussed.   Risks were discussed with the patient. Verbal  and written consent was obtained prior the procedure and are detailed within the patient's record.    Patient was placed in sitting position. Alcohol and Betadine ×3 used to prep the area. Identified the point of injection and anesthetize skin and subcutaneous tissue with 2mL of lidocaine. Sterile 21-gauge needle was inserted joint space. Negative aspiration was noted. 40mg of Kenalog was then injected along with lidocaine. The needle was then removed and sterile bandage to puncture site. Patient was able to tolerate the procedure well.    ------  Written by Sherry Farley RN, acting as a scribe for Dr. Erickson. This note accurately reflects the work and decisions made by Dr. Erickson.     I, Dr. Erickson, attest all medical record entries made by the scribe were under my direction and were personally dictated by me. I have reviewed the chart and agree that the record accurately reflects my performance of the history, physical exam, and assessment and plan.

## 2025-04-15 RX ORDER — TRIAMCINOLONE ACETONIDE 40 MG/ML
40 INJECTION, SUSPENSION INTRA-ARTICULAR; INTRAMUSCULAR
Status: COMPLETED | OUTPATIENT
Start: 2025-04-14 | End: 2025-04-14

## 2025-04-15 RX ORDER — LIDOCAINE HYDROCHLORIDE 10 MG/ML
4 INJECTION, SOLUTION INFILTRATION; PERINEURAL
Status: COMPLETED | OUTPATIENT
Start: 2025-04-14 | End: 2025-04-14

## 2025-04-15 NOTE — PATIENT INSTRUCTIONS
It was great to see you in the office today! Here is what we discussed at your visit today:    You received an injection in your left shoulder today  Ok to resume activity as tolerated

## 2025-04-23 DIAGNOSIS — F32.A DEPRESSION, UNSPECIFIED DEPRESSION TYPE: ICD-10-CM

## 2025-04-23 RX ORDER — BUSPIRONE HYDROCHLORIDE 10 MG/1
TABLET ORAL
Qty: 225 TABLET | Refills: 1 | Status: SHIPPED | OUTPATIENT
Start: 2025-04-23

## 2025-05-27 DIAGNOSIS — J44.9 CHRONIC OBSTRUCTIVE PULMONARY DISEASE, UNSPECIFIED COPD TYPE (MULTI): ICD-10-CM

## 2025-05-27 DIAGNOSIS — F32.A DEPRESSION, UNSPECIFIED DEPRESSION TYPE: ICD-10-CM

## 2025-05-27 RX ORDER — ALBUTEROL SULFATE 90 UG/1
2 INHALANT RESPIRATORY (INHALATION) EVERY 6 HOURS PRN
Qty: 18 G | Refills: 2 | Status: SHIPPED | OUTPATIENT
Start: 2025-05-27

## 2025-05-27 RX ORDER — BUSPIRONE HYDROCHLORIDE 10 MG/1
TABLET ORAL
Qty: 225 TABLET | Refills: 1 | Status: SHIPPED | OUTPATIENT
Start: 2025-05-27

## 2025-05-27 RX ORDER — DESVENLAFAXINE 50 MG/1
TABLET, EXTENDED RELEASE ORAL
Qty: 90 TABLET | Refills: 1 | Status: SHIPPED | OUTPATIENT
Start: 2025-05-27

## 2025-05-27 RX ORDER — DESVENLAFAXINE SUCCINATE 25 MG/1
TABLET, EXTENDED RELEASE ORAL
Qty: 90 TABLET | Refills: 1 | Status: SHIPPED | OUTPATIENT
Start: 2025-05-27

## 2025-07-23 DIAGNOSIS — Z12.31 ENCOUNTER FOR SCREENING MAMMOGRAM FOR BREAST CANCER: ICD-10-CM

## 2025-08-25 ENCOUNTER — APPOINTMENT (OUTPATIENT)
Dept: RADIOLOGY | Facility: HOSPITAL | Age: 50
End: 2025-08-25
Payer: COMMERCIAL